# Patient Record
Sex: FEMALE | Race: WHITE | Employment: OTHER | URBAN - NONMETROPOLITAN AREA
[De-identification: names, ages, dates, MRNs, and addresses within clinical notes are randomized per-mention and may not be internally consistent; named-entity substitution may affect disease eponyms.]

---

## 2019-06-27 ENCOUNTER — TELEPHONE (OUTPATIENT)
Dept: SURGERY | Age: 74
End: 2019-06-27

## 2019-07-26 ENCOUNTER — INITIAL CONSULT (OUTPATIENT)
Dept: SURGERY | Age: 74
End: 2019-07-26
Payer: MEDICARE

## 2019-07-26 VITALS
DIASTOLIC BLOOD PRESSURE: 80 MMHG | HEIGHT: 64 IN | SYSTOLIC BLOOD PRESSURE: 130 MMHG | WEIGHT: 214.8 LBS | HEART RATE: 64 BPM | BODY MASS INDEX: 36.67 KG/M2

## 2019-07-26 DIAGNOSIS — R10.84 GENERALIZED ABDOMINAL PAIN: Primary | ICD-10-CM

## 2019-07-26 PROCEDURE — 3017F COLORECTAL CA SCREEN DOC REV: CPT | Performed by: SURGERY

## 2019-07-26 PROCEDURE — 1090F PRES/ABSN URINE INCON ASSESS: CPT | Performed by: SURGERY

## 2019-07-26 PROCEDURE — 4040F PNEUMOC VAC/ADMIN/RCVD: CPT | Performed by: SURGERY

## 2019-07-26 PROCEDURE — 1123F ACP DISCUSS/DSCN MKR DOCD: CPT | Performed by: SURGERY

## 2019-07-26 PROCEDURE — G8417 CALC BMI ABV UP PARAM F/U: HCPCS | Performed by: SURGERY

## 2019-07-26 PROCEDURE — G8400 PT W/DXA NO RESULTS DOC: HCPCS | Performed by: SURGERY

## 2019-07-26 PROCEDURE — G8427 DOCREV CUR MEDS BY ELIG CLIN: HCPCS | Performed by: SURGERY

## 2019-07-26 PROCEDURE — 99214 OFFICE O/P EST MOD 30 MIN: CPT | Performed by: SURGERY

## 2019-07-26 PROCEDURE — 1036F TOBACCO NON-USER: CPT | Performed by: SURGERY

## 2019-07-26 RX ORDER — CITALOPRAM 20 MG/1
20 TABLET ORAL
COMMUNITY
Start: 2018-09-19

## 2019-07-26 RX ORDER — ATENOLOL 50 MG/1
75 TABLET ORAL
COMMUNITY
Start: 2019-03-04

## 2019-07-26 RX ORDER — ASPIRIN 81 MG/1
81 TABLET ORAL DAILY
COMMUNITY

## 2019-07-26 RX ORDER — METFORMIN HYDROCHLORIDE 500 MG/1
500 TABLET, EXTENDED RELEASE ORAL
COMMUNITY
Start: 2019-03-25 | End: 2022-10-05

## 2019-07-26 RX ORDER — ALBUTEROL SULFATE 90 UG/1
2 AEROSOL, METERED RESPIRATORY (INHALATION)
COMMUNITY
Start: 2019-01-15

## 2019-07-26 RX ORDER — POLYETHYLENE GLYCOL 3350, SODIUM CHLORIDE, SODIUM BICARBONATE, POTASSIUM CHLORIDE 420; 11.2; 5.72; 1.48 G/4L; G/4L; G/4L; G/4L
POWDER, FOR SOLUTION ORAL
Qty: 1 BOTTLE | Refills: 0 | Status: SHIPPED | OUTPATIENT
Start: 2019-07-26 | End: 2022-10-05

## 2019-07-26 RX ORDER — OMEGA-3 FATTY ACIDS/FISH OIL 300-1000MG
CAPSULE ORAL
COMMUNITY

## 2019-07-26 RX ORDER — SIMVASTATIN 40 MG
TABLET ORAL
COMMUNITY
Start: 2018-12-05 | End: 2022-10-05

## 2019-07-26 NOTE — PROGRESS NOTES
MCG/DOSE AEPB INHALE ONE DOSE BY MOUTH TWICE DAILY (doing daily), rinse mouth after use, by pulm      [facility] ibuprofen (ADVIL;MOTRIN) 200 MG CAPS Take by mouth      metFORMIN (GLUCOPHAGE-XR) 500 MG extended release tablet Take 500 mg by mouth      simvastatin (ZOCOR) 40 MG tablet TAKE ONE TABLET BY MOUTH ONCE DAILY       No current facility-administered medications for this visit. Home Medications:   Prior to Admission medications    Medication Sig Start Date End Date Taking? Authorizing Provider   aspirin (ASPIRIN 81) 81 MG EC tablet Take 81 mg by mouth daily   Yes Historical Provider, MD   albuterol sulfate  (90 Base) MCG/ACT inhaler Inhale 2 puffs into the lungs 1/15/19  Yes Historical Provider, MD   atenolol (TENORMIN) 50 MG tablet Take 75 mg by mouth 3/4/19  Yes Historical Provider, MD   citalopram (CELEXA) 20 MG tablet Take 20 mg by mouth 9/19/18  Yes Historical Provider, MD   fluticasone-salmeterol (ADVAIR) 250-50 MCG/DOSE AEPB INHALE ONE DOSE BY MOUTH TWICE DAILY (doing daily), rinse mouth after use, by pulnicole valera 5/3/19  Yes Historical Provider, MD   ibuprofen (ADVIL;MOTRIN) 200 MG CAPS Take by mouth   Yes Historical Provider, MD   metFORMIN (GLUCOPHAGE-XR) 500 MG extended release tablet Take 500 mg by mouth 3/25/19 3/24/20 Yes Historical Provider, MD   simvastatin (ZOCOR) 40 MG tablet TAKE ONE TABLET BY MOUTH ONCE DAILY 12/5/18  Yes Historical Provider, MD       Allergies  Loni Bush; Other; and Adhesive tape      Review of Systems:  General: Denies any fever, chills. HEENT: Denies any diplopia, tinnitus or vertigo. Respiratory: Denies any shortness of breath or cough. Cardiac: Denies any chest pain, palpitations, claudication or edema. Gastrointestinal: Denies any melena, hematochezia, hematemesis or pyrosis. Genitourinary: Denies any frequency, urgency, hesitancy or incontinence. Hematologic: Denies bruising or bleeding easily.   Endocrine: Denies any history of diabetes or thyroid disease. PHYSICAL EXAMINATION  Vitals:   Vitals:    07/26/19 1330   BP: 130/80   Pulse: 64     General Appearance:  awake, alert, oriented, in no acute distress, well developed, well nourished and in no acute distress  Skin:  Skin color, texture, turgor normal. No rashes or lesions. Head/face:  NCAT  Eyes:  No gross abnormalities. , PERRL, Pupils- PERRL. Ears:  canals and TMs NI and External- normal  Nose/Sinuses:  Nares normal. Septum midline. Mucosa normal. No drainage or sinus tenderness. Mouth/Throat:  Mucosa moist.  No lesions. Pharynx without erythema, edema or exudate. Lungs:  Normal expansion. Clear to auscultation. No rales, rhonchi, or wheezing., Breathing Pattern: regular, no distress, Breath sounds: normal  Heart:  Heart sounds are normal.  Regular rate and rhythm without murmur, gallop or rub. Auscultation: Normal S1 and S2.  Regular rhythm. No murmurs, gallops, or rubs. Abdomen:  Soft, non-tender, normal bowel sounds. No bruits, organomegaly or masses. Musculoskeletal:  negative, negative findings: no erythema, induration, or nodules, ROM of all joints is normal, strength normal  Neurologic:  negative findings: proximal muscle strength normal, speech normal, mental status intact, cranial nerves 2-12 intact, muscle tone normal, muscle strength normal    LABS:  CBC No results found for: WBC, HGB, HCT, PLT  BMP No results found for: NA, K, CL, CO2, BUN, CREATININE, PHOS, MG  LFT's: No results found for: AST, ALT, ALKPHOS, BILITOT, BILIDIR, AMYLASE, LIPASE, LACTATE  COAGS: No results found for: INR, PTT  Lipids: No results found for: CHOL, HDL, LDLCHOLESTEROL, CHOLHDLRATIO, TRIG, VLDL    RADIOLOGY:  All images reviewed and within normal limits unless otherwise specified: Yes    IMPRESSIONS:  1.  heartbutnSurgical Risk: moderate risk    PLAN:  1. EGD/CS    Medical Decision Making: moderate complexity    Thank you for the interesting evaluation. Further recommendations to follow.     Valerie Hall

## 2022-10-05 RX ORDER — MECLIZINE HCL 12.5 MG/1
12.5 TABLET ORAL 3 TIMES DAILY PRN
COMMUNITY

## 2022-10-05 RX ORDER — SUMATRIPTAN 50 MG/1
50 TABLET, FILM COATED ORAL
COMMUNITY

## 2022-10-05 RX ORDER — FAMOTIDINE 40 MG/1
40 TABLET, FILM COATED ORAL NIGHTLY
COMMUNITY

## 2022-10-05 RX ORDER — HYDROCODONE POLISTIREX AND CHLORPHENIRAMINE POLISTIREX 10; 8 MG/5ML; MG/5ML
5 SUSPENSION, EXTENDED RELEASE ORAL
COMMUNITY
Start: 2017-12-04 | End: 2022-10-05

## 2022-10-05 RX ORDER — AMLODIPINE BESYLATE 5 MG/1
5 TABLET ORAL DAILY
COMMUNITY

## 2022-10-05 RX ORDER — ROSUVASTATIN CALCIUM 20 MG/1
20 TABLET, COATED ORAL DAILY
COMMUNITY

## 2022-10-05 RX ORDER — HYDROCORTISONE ACETATE AND PRAMOXINE HYDROCHLORIDE 25; 18 MG/1; MG/1
SUPPOSITORY RECTAL
COMMUNITY

## 2022-10-19 ENCOUNTER — OFFICE VISIT (OUTPATIENT)
Dept: SURGERY | Age: 77
End: 2022-10-19
Payer: MEDICARE

## 2022-10-19 VITALS
SYSTOLIC BLOOD PRESSURE: 124 MMHG | RESPIRATION RATE: 14 BRPM | WEIGHT: 209 LBS | TEMPERATURE: 97.6 F | HEIGHT: 64 IN | DIASTOLIC BLOOD PRESSURE: 76 MMHG | BODY MASS INDEX: 35.68 KG/M2 | HEART RATE: 62 BPM

## 2022-10-19 DIAGNOSIS — K62.89 RECTAL PAIN: Primary | ICD-10-CM

## 2022-10-19 PROCEDURE — G8400 PT W/DXA NO RESULTS DOC: HCPCS | Performed by: SURGERY

## 2022-10-19 PROCEDURE — 4004F PT TOBACCO SCREEN RCVD TLK: CPT | Performed by: SURGERY

## 2022-10-19 PROCEDURE — 1090F PRES/ABSN URINE INCON ASSESS: CPT | Performed by: SURGERY

## 2022-10-19 PROCEDURE — 99212 OFFICE O/P EST SF 10 MIN: CPT | Performed by: SURGERY

## 2022-10-19 PROCEDURE — G8417 CALC BMI ABV UP PARAM F/U: HCPCS | Performed by: SURGERY

## 2022-10-19 PROCEDURE — 1123F ACP DISCUSS/DSCN MKR DOCD: CPT | Performed by: SURGERY

## 2022-10-19 PROCEDURE — G8484 FLU IMMUNIZE NO ADMIN: HCPCS | Performed by: SURGERY

## 2022-10-19 PROCEDURE — G8427 DOCREV CUR MEDS BY ELIG CLIN: HCPCS | Performed by: SURGERY

## 2022-10-19 RX ORDER — COVID-19 MOLECULAR TEST ASSAY
KIT MISCELLANEOUS
COMMUNITY
Start: 2022-10-12

## 2022-10-19 RX ORDER — CALCIUM CITRATE/VITAMIN D3 200MG-6.25
TABLET ORAL
COMMUNITY
Start: 2021-04-15

## 2022-10-19 RX ORDER — HYDROCORTISONE ACETATE 25 MG
SUPPOSITORY, RECTAL RECTAL
COMMUNITY
Start: 2022-10-04

## 2022-10-19 NOTE — PROGRESS NOTES
Douglas Klein is a 68 y.o. female      CC:    Pain left side of rectum    HISTORY OF PRESENT ILLNESS:    Pt is a 69 yo F who had pain start in left side of rectum . It started in august.  Intermittent burning and stinging. Then she saw Dr. Ariella Bower who gave her suppository and after several of them the pain stopped. Now no symptoms. Last CS 2019, due for next CS in 2024. Abd pain: no  Anemia: no  Bloating:no  Diarrhea: yes, intermittent dx with IBS  Constipation: yes, intermittent dx with IBS  Melena: no  Hematochezia:no  Rectal Bleeding:no  Rectal/Anal Pain:yes, started in August on left side, intermittent burning and stinging  Pruritus: no  Family history colon Cancer: no  Previous colon cancer: no  Previous Colon Polyp: yes, in 2019  Change in bowels: no  Decrease caliber of stool: no  Change in color of stool: no     Previous work up date: Colonoscopy on 8/16/2019 by Dr. Britta Marinelli at [facility] tubular adenoma x2       Review of Systems:    General:  Fever: Negative  Weight Change:Negative  Night Sweats: Negative    Eye:  Blurry Vision:Negative  Double Vision: Negative    Ent:  Headaches: Negative  Sore throat: Negative    Allergy/Immunology:  Hives: Negative  Latex allergy: Negative    Hematology/Lymphatic:  Bleeding Problems: Negative  Blood Clots: Negative  Swollen Lymph Nodes: Negative    Lungs:  Cough: Negative  SOB: Negative  Wheezing:Negative    Cardiovascular:  Chest Pain: Negative  Palpitations:Negative    GI:   Decreased Appetite: Negative  Heartburn: Negative  Dysphagia: Negative  Nausea/Vomiting: Negative  Abdominal Pain: Negative  Change in Bowels:Negative  Constipation: Negative  Diarrhea: Negative  Rectal Bleeding: Negative    :   Dysuria: Negative  Increase Urinary Frequency/Urgency: Negative    Neuro:  Seizures: Negative  Confusion: Negative        PAST MEDICAL HISTORY:      No family history on file.   Social History     Socioeconomic History    Marital status:      Spouse name: Not on file    Number of children: Not on file    Years of education: Not on file    Highest education level: Not on file   Occupational History    Not on file   Tobacco Use    Smoking status: Never    Smokeless tobacco: Never   Substance and Sexual Activity    Alcohol use: Yes    Drug use: Not on file    Sexual activity: Not on file   Other Topics Concern    Not on file   Social History Narrative    Not on file     Social Determinants of Health     Financial Resource Strain: Not on file   Food Insecurity: Not on file   Transportation Needs: Not on file   Physical Activity: Not on file   Stress: Not on file   Social Connections: Not on file   Intimate Partner Violence: Not on file   Housing Stability: Not on file     Past Surgical History:   Procedure Laterality Date    CHOLECYSTECTOMY      COLONOSCOPY  2010    in [address] [place] [address]  08/16/2019    tubular adenoma x2-[facility]    ESOPHAGOGASTRODUODENOSCOPY  09/2015    path (no cancer seen)    LIP SURGERY  02/2012    lower lip-path ok    MOLE REMOVAL      NECK SURGERY      STOMACH SURGERY      TUBAL LIGATION      UPPER GASTROINTESTINAL ENDOSCOPY  08/16/2019    mild [id number]    VAGINAL DELIVERY      x2    VAGOTOMY AND PYLOROPLASTY      VEIN LIGATION AND STRIPPING Bilateral     leg     Past Medical History:   Diagnosis Date    Adiposity     Anxiety     Arthritis     Asthma     COPD (chronic obstructive pulmonary disease) ([facility] [address]     Depression     GERD (gastroesophageal reflux disease)     History of shingles     Hyperlipidemia     Hypertension     Irritable bowel syndrome     LAFB (left anterior fascicular block)     Migraines     Posterior vitreous detachment of both eyes     Symptomatic PVCs     Type 2 diabetes mellitus without complication (HCC)     Vitamin D deficiency      Current Outpatient Medications on File Prior to Visit   Medication Sig Dispense Refill    blood glucose test strips (TRUE METRIX BLOOD GLUCOSE TEST) strip USE TO CHECK GLUCOSE ONCE DAILY

## 2022-10-19 NOTE — PROGRESS NOTES
Reason for evaluation: rectal pain (left side)       Abd pain: no  Anemia: no  Bloating:no  Diarrhea: yes, intermittent dx with IBS  Constipation: yes, intermittent dx with IBS  Melena: no  Hematochezia:no  Rectal Bleeding:no  Rectal/Anal Pain:yes, started in August on left side, intermittent burning and stinging  Pruritus: no  Family history colon Cancer: no  Previous colon cancer: no  Previous Colon Polyp: yes, in 2019  Change in bowels: no  Decrease caliber of stool: no  Change in color of stool: no    Previous work up date: Colonoscopy on 8/16/2019 by Dr. Rosales Phelan at [facility] tubular adenoma x2

## 2022-10-26 ENCOUNTER — TELEPHONE (OUTPATIENT)
Dept: SURGERY | Age: 77
End: 2022-10-26

## 2022-10-26 NOTE — LETTER
Kayy Knowles 79         Patient:Amanda Harmon Gum           XZD:[id number]           Surgical/Procedure Planned: EUA of rectum    Date & Location: 11/16/2022 at [facility]       Outpatient   Planned Length of OR: 30 min    Sedation: intravenous sedation      Estimated Cardiac Risk for Non-Cardiac Surgery/Procedure     Low______ Moderate______ High______    Medication Instructions - Clarification needed by this date:       ASA 81 mg Hold ___ Days      Resume medications:     Lovenox indicated: _____Yes _____NO    Provider:Dr. Camden Harrell of Provider Giving Orders for Medication holds:    _____________________________________________

## 2022-10-26 NOTE — TELEPHONE ENCOUNTER
Received call from patient. She states she is having the rectal pain again. She is would like to schedule the EUA of rectum at [facility]. Scheduled for November 16th, 2022-contacted surgery center and scheduled. Will mail instructions to patient.